# Patient Record
Sex: MALE | Race: WHITE | Employment: FULL TIME | URBAN - METROPOLITAN AREA
[De-identification: names, ages, dates, MRNs, and addresses within clinical notes are randomized per-mention and may not be internally consistent; named-entity substitution may affect disease eponyms.]

---

## 2020-01-14 ENCOUNTER — APPOINTMENT (OUTPATIENT)
Dept: CT IMAGING | Age: 35
End: 2020-01-14
Attending: EMERGENCY MEDICINE
Payer: COMMERCIAL

## 2020-01-14 ENCOUNTER — HOSPITAL ENCOUNTER (EMERGENCY)
Age: 35
Discharge: HOME | End: 2020-01-14
Attending: EMERGENCY MEDICINE
Payer: COMMERCIAL

## 2020-01-14 ENCOUNTER — APPOINTMENT (OUTPATIENT)
Dept: GENERAL RADIOLOGY | Age: 35
End: 2020-01-14
Attending: PHYSICIAN ASSISTANT
Payer: COMMERCIAL

## 2020-01-14 VITALS
DIASTOLIC BLOOD PRESSURE: 84 MMHG | OXYGEN SATURATION: 97 % | TEMPERATURE: 98.5 F | HEART RATE: 84 BPM | WEIGHT: 315 LBS | BODY MASS INDEX: 47.74 KG/M2 | SYSTOLIC BLOOD PRESSURE: 127 MMHG | HEIGHT: 68 IN | RESPIRATION RATE: 18 BRPM

## 2020-01-14 DIAGNOSIS — R07.9 CHEST PAIN, UNSPECIFIED TYPE: Primary | ICD-10-CM

## 2020-01-14 LAB
ANION GAP SERPL CALC-SCNC: 4 MMOL/L (ref 5–15)
ATRIAL RATE: 95 BPM
BASOPHILS # BLD: 0 K/UL (ref 0–0.1)
BASOPHILS NFR BLD: 0 % (ref 0–1)
BUN SERPL-MCNC: 13 MG/DL (ref 6–20)
BUN/CREAT SERPL: 18 (ref 12–20)
CALCIUM SERPL-MCNC: 9.7 MG/DL (ref 8.5–10.1)
CALCULATED P AXIS, ECG09: 17 DEGREES
CALCULATED R AXIS, ECG10: 24 DEGREES
CALCULATED T AXIS, ECG11: 0 DEGREES
CHLORIDE SERPL-SCNC: 104 MMOL/L (ref 97–108)
CO2 SERPL-SCNC: 28 MMOL/L (ref 21–32)
CREAT SERPL-MCNC: 0.72 MG/DL (ref 0.7–1.3)
D DIMER PPP FEU-MCNC: 0.67 MG/L FEU (ref 0–0.65)
DIAGNOSIS, 93000: NORMAL
DIFFERENTIAL METHOD BLD: NORMAL
EOSINOPHIL # BLD: 0.1 K/UL (ref 0–0.4)
EOSINOPHIL NFR BLD: 1 % (ref 0–7)
ERYTHROCYTE [DISTWIDTH] IN BLOOD BY AUTOMATED COUNT: 13.2 % (ref 11.5–14.5)
GLUCOSE SERPL-MCNC: 121 MG/DL (ref 65–100)
HCT VFR BLD AUTO: 48.3 % (ref 36.6–50.3)
HGB BLD-MCNC: 16.1 G/DL (ref 12.1–17)
IMM GRANULOCYTES # BLD AUTO: 0 K/UL (ref 0–0.04)
IMM GRANULOCYTES NFR BLD AUTO: 0 % (ref 0–0.5)
LYMPHOCYTES # BLD: 2.4 K/UL (ref 0.8–3.5)
LYMPHOCYTES NFR BLD: 26 % (ref 12–49)
MCH RBC QN AUTO: 29.7 PG (ref 26–34)
MCHC RBC AUTO-ENTMCNC: 33.3 G/DL (ref 30–36.5)
MCV RBC AUTO: 89 FL (ref 80–99)
MONOCYTES # BLD: 0.7 K/UL (ref 0–1)
MONOCYTES NFR BLD: 8 % (ref 5–13)
NEUTS SEG # BLD: 6 K/UL (ref 1.8–8)
NEUTS SEG NFR BLD: 65 % (ref 32–75)
NRBC # BLD: 0 K/UL (ref 0–0.01)
NRBC BLD-RTO: 0 PER 100 WBC
P-R INTERVAL, ECG05: 170 MS
PLATELET # BLD AUTO: 294 K/UL (ref 150–400)
PMV BLD AUTO: 11.5 FL (ref 8.9–12.9)
POTASSIUM SERPL-SCNC: 3.9 MMOL/L (ref 3.5–5.1)
Q-T INTERVAL, ECG07: 332 MS
QRS DURATION, ECG06: 106 MS
QTC CALCULATION (BEZET), ECG08: 417 MS
RBC # BLD AUTO: 5.43 M/UL (ref 4.1–5.7)
SODIUM SERPL-SCNC: 136 MMOL/L (ref 136–145)
TROPONIN I SERPL-MCNC: <0.05 NG/ML
VENTRICULAR RATE, ECG03: 95 BPM
WBC # BLD AUTO: 9.2 K/UL (ref 4.1–11.1)

## 2020-01-14 PROCEDURE — 84484 ASSAY OF TROPONIN QUANT: CPT

## 2020-01-14 PROCEDURE — 74011636320 HC RX REV CODE- 636/320: Performed by: RADIOLOGY

## 2020-01-14 PROCEDURE — 80048 BASIC METABOLIC PNL TOTAL CA: CPT

## 2020-01-14 PROCEDURE — 99284 EMERGENCY DEPT VISIT MOD MDM: CPT

## 2020-01-14 PROCEDURE — 36415 COLL VENOUS BLD VENIPUNCTURE: CPT

## 2020-01-14 PROCEDURE — 85025 COMPLETE CBC W/AUTO DIFF WBC: CPT

## 2020-01-14 PROCEDURE — 71046 X-RAY EXAM CHEST 2 VIEWS: CPT

## 2020-01-14 PROCEDURE — 71275 CT ANGIOGRAPHY CHEST: CPT

## 2020-01-14 PROCEDURE — 93005 ELECTROCARDIOGRAM TRACING: CPT

## 2020-01-14 PROCEDURE — 85379 FIBRIN DEGRADATION QUANT: CPT

## 2020-01-14 PROCEDURE — 74011250637 HC RX REV CODE- 250/637: Performed by: PHYSICIAN ASSISTANT

## 2020-01-14 RX ORDER — GUAIFENESIN 100 MG/5ML
162 LIQUID (ML) ORAL
Status: COMPLETED | OUTPATIENT
Start: 2020-01-14 | End: 2020-01-14

## 2020-01-14 RX ADMIN — IOPAMIDOL 80 ML: 755 INJECTION, SOLUTION INTRAVENOUS at 16:22

## 2020-01-14 RX ADMIN — ASPIRIN 162 MG: 81 TABLET, CHEWABLE ORAL at 15:05

## 2020-01-14 NOTE — ED TRIAGE NOTES
Pt went to [facility] for chest pain, nausea, and left arm pain starting thursday and referred here for abnormal EKG. Negative trop.

## 2020-01-14 NOTE — LETTER
[address] 
[facility] EMERGENCY DEPT 
[address] [facility] [place] [facility] [id number] [phone] Work/School Note Date: 1/14/2020 To Whom It May concern: 
 
Ismael Lee was seen and treated today in the emergency room by the following provider(s): 
Attending Provider: Aby Oropeza, [address] may return to work on 12/16/20. Sincerely, Cassandra Hernandez RN

## 2020-01-14 NOTE — ED PROVIDER NOTES
Pt seen at [facility] today and advised to ED due to abnormal EKG. He went to [facility] due to intermittent chest pain since Thursday (5 days ago), with accompanying L arm pain. His episodes of pain usually last a few minutes. He also endorses nausea. He denies vomiting. He is a pt of , and last had a stress test in 2013. He takes 81 mg aspirin daily, and took a dose this AM.     I have evaluated the patient as the Provider in Triage. I have reviewed His vital signs and the triage nurse assessment. I have talked with the patient and any available family and advised that I am the provider in triage and have ordered the appropriate study to initiate their work up based on the clinical presentation during my assessment. I have advised that the patient will be accommodated in the Main ED as soon as possible. I have also requested to contact the triage nurse or myself immediately if the patient experiences any changes in their condition during this brief waiting period. Note written by Ute Graham Cha, as dictated by Vanessa Valdez PA-C 2:08 PM  ---  37 y.o. male with past medical history significant for dyslipidemia, metabolic syndrome, seasonal allergies, asthma, HTN, and PVC's who presents from private vehicle with chief complaint of chest pain. Pt c/o 5 days of intermittent left upper chest pain. Pt's chest pain is not worsened with exercise. Pt has not taken any other medications for his chest pain. Pt reports previous hx of stress test by Dr. Lizzy Nur (Cardiology). Pt denies SOB, dizziness, diaphoresis, N/V, recent travel, hx of CAD, or hx of blood clots. There are no other acute medical concerns at this time. Social hx: Former tobacco use. Significant FMHx: Father has a hx of CAD later in life. PCP: Damian Arroyo MD    Note written by Ute Orantes, as dictated by Filbert Fothergill, MD 2:38 PM        The history is provided by the patient.  No  was used.        Past Medical History:   Diagnosis Date    Asthma     Dyslipidemia     Family history of cardiovascular disease     History of seasonal allergies     HTN (hypertension)     Metabolic syndrome     Morbid obesity ([facility] [address]     PVC's (premature ventricular contractions)     bigeminy       No past surgical history on file.       Family History:   Problem Relation Age of Onset    Heart Disease Father     Attention Deficit Disorder Father     Hypertension Father     Hypertension Mother     Asthma Mother        Social History     Socioeconomic History    Marital status:      Spouse name: Not on file    Number of children: Not on file    Years of education: Not on file    Highest education level: Not on file   Occupational History    Not on file   Social Needs    Financial resource strain: Not on file    Food insecurity:     Worry: Not on file     Inability: Not on file    Transportation needs:     Medical: Not on file     Non-medical: Not on file   Tobacco Use    Smoking status: Heavy Tobacco Smoker     Packs/day: 1.00   Substance and Sexual Activity    Alcohol use: Yes     Comment: rare    Drug use: Not on file    Sexual activity: Not on file   Lifestyle    Physical activity:     Days per week: Not on file     Minutes per session: Not on file    Stress: Not on file   Relationships    Social connections:     Talks on phone: Not on file     Gets together: Not on file     Attends Religion service: Not on file     Active member of club or organization: Not on file     Attends meetings of clubs or organizations: Not on file     Relationship status: Not on file    Intimate partner violence:     Fear of current or ex partner: Not on file     Emotionally abused: Not on file     Physically abused: Not on file     Forced sexual activity: Not on file   Other Topics Concern    Not on file   Social History Narrative    Not on file         ALLERGIES: Patient has no known allergies. Review of Systems   Constitutional: Negative for activity change, chills and diaphoresis. HENT: Negative for nosebleeds, sore throat, trouble swallowing and voice change. Eyes: Negative for visual disturbance. Cardiovascular: Positive for chest pain. Gastrointestinal: Negative for constipation, diarrhea, nausea and vomiting. Genitourinary: Negative for difficulty urinating, dysuria, hematuria and urgency. Musculoskeletal: Negative for neck pain and neck stiffness. Skin: Negative for color change. Allergic/Immunologic: Negative for immunocompromised state. Neurological: Negative for dizziness, seizures, syncope and light-headedness. Psychiatric/Behavioral: Negative for behavioral problems, confusion, hallucinations, self-injury and suicidal ideas. Vitals:    01/14/20 1405   BP: 160/86   Pulse: (!) 108   Resp: 14   Temp: 98.5 °F (36.9 °C)   SpO2: 97%   Weight: 152 kg (335 lb)   Height: 5' 8\" (1.727 m)            Physical Exam  Vitals signs and nursing note reviewed. Constitutional:       General: He is not in acute distress. Appearance: He is well-developed. He is not diaphoretic. HENT:      Head: Normocephalic and atraumatic. Eyes:      Pupils: Pupils are equal, round, and reactive to light. Neck:      Musculoskeletal: Normal range of motion and neck supple. Cardiovascular:      Rate and Rhythm: Normal rate and regular rhythm. Heart sounds: Normal heart sounds. No murmur. No friction rub. No gallop. Pulmonary:      Effort: Pulmonary effort is normal. No respiratory distress. Breath sounds: Normal breath sounds. No wheezing. Chest:      Comments: No pain to the left upper chest.   Abdominal:      General: Bowel sounds are normal. There is no distension. Palpations: Abdomen is soft. Tenderness: There is no tenderness. There is no guarding or rebound. Musculoskeletal: Normal range of motion. Skin:     General: Skin is warm. Findings: No rash. Neurological:      Mental Status: He is alert and oriented to person, place, and time. Psychiatric:         Behavior: Behavior normal.         Thought Content: Thought content normal.         Judgment: Judgment normal.     Note written by Ute Garcia, as dictated by Nigel Collins MD 2:38 PM       MDM     This is a 61-year-old male with past medical history, review of systems, physical exam as above, presenting with complaints for approximately 5 days of intermittent left upper chest pain, characterized as sharp in nature, nonradiating, not associated with shortness of breath, diaphoresis, syncope, nausea or vomiting, not made better by rest, nor worsened by exercise, patient denies having taken medications for symptoms. He endorses a history of hypertension, obesity, family history of coronary disease, previous tobacco use, no history of hyperlipidemia, diabetes, patient states previous stress testing was unremarkable. Physical exam is remarkable for well-appearing obese male, in no acute distress, with no reproducible chest pain to palpation, noted to have clear breath sounds, soft abdomen, regular rate and rhythm without murmurs gallops or rubs. Differential includes ACS, chest wall pain, PE. Plan to obtain CMP, CBC, EKG, chest x-ray, cardiac enzymes, d-dimer. We will reassess, and make a disposition, however patient states pain has been ongoing for approximately 5 days, thus cardiac enzymes should be elevated by this point time. Disposition pending. Procedures    ED EKG interpretation:  Rhythm: normal sinus rhythm; and regular .  Rate (approx.): 95 BPM; Axis: normal; ST/T wave: normal;   Note written by Ute Garcia, as dictated by Nigel Collins MD 2:25 PM

## 2020-01-14 NOTE — DISCHARGE INSTRUCTIONS

## 2020-02-03 ENCOUNTER — OFFICE VISIT (OUTPATIENT)
Dept: CARDIOLOGY CLINIC | Age: 35
End: 2020-02-03

## 2020-02-03 VITALS
BODY MASS INDEX: 47.74 KG/M2 | DIASTOLIC BLOOD PRESSURE: 88 MMHG | HEART RATE: 91 BPM | SYSTOLIC BLOOD PRESSURE: 130 MMHG | WEIGHT: 315 LBS | OXYGEN SATURATION: 97 % | HEIGHT: 68 IN

## 2020-02-03 DIAGNOSIS — E66.01 MORBID OBESITY (HCC): ICD-10-CM

## 2020-02-03 DIAGNOSIS — G47.33 OSA (OBSTRUCTIVE SLEEP APNEA): ICD-10-CM

## 2020-02-03 DIAGNOSIS — I51.7 LVH (LEFT VENTRICULAR HYPERTROPHY): ICD-10-CM

## 2020-02-03 DIAGNOSIS — I10 HTN (HYPERTENSION), BENIGN: ICD-10-CM

## 2020-02-03 DIAGNOSIS — R07.9 CHEST PAIN SYNDROME: Primary | ICD-10-CM

## 2020-02-03 RX ORDER — PHENTERMINE HYDROCHLORIDE 37.5 MG/1
TABLET ORAL
COMMUNITY
Start: 2019-12-02

## 2020-02-03 NOTE — PROGRESS NOTES
Chief Complaint   Patient presents with   [facility] Follow Up     Visit Vitals  /88 (BP 1 Location: Left arm, BP Patient Position: Sitting)   Pulse 91   Ht 5' 8\" (1.727 m)   Wt 338 lb (153.3 kg)   SpO2 97%   BMI 51.39 kg/m²     Patient in [facility] does have PVC's but no palpitations aware of  no other cardiac complaints. No hospital stays.     No cardiac refills

## 2020-02-03 NOTE — PROGRESS NOTES
William Rosen MD [facility]  [address]  [place], [address] [facility]    Office [phone]  Fax [phone]  Cell [phone]       Mehrdad Leos is a 37 y.o. male referred for evaluation of chest pain. Consult requested by Tesfaye Rivera MD      Diagnoses  Encounter Diagnoses     ICD-10-CM ICD-9-CM   1. Chest pain syndrome R07.9 786.50   2. Morbid obesity ([address] E66.01 278.01   3. HTN (hypertension), benign I10 401.1   4. ELEAZAR (obstructive sleep apnea) G47.33 327.23   5. LVH (left ventricular hypertrophy) I51.7 429.3       Assessment/Recommendations:    Recent chest pain syndrome, clearly non cardiac. He has no ongoing sxs, nor any exertional sxs,. Stress echo 2013 was normal. Would not pursue repeat stress test at this time    Chronic HTN in the setting of morbid obesity. Evidence of LVH by EKG and noted on CT scan. He only recently resumed his BP meds. Echo 2013 was remarkable for mild RV dilation, biatrial enlargement, but no LVH. - Reassess with echo near future. ELEAZAR, untreated. He clearly has significant sxs. I urged him to touch [facility] sleep apnea. Morbid obesity, recently started on Phentermine. He has a lot of stressors in his life: work, kids, etc.    Phone follow up after reviewing tests       Subjective:    Mehrdad Leos has no previous CV hx except for HTN. He reports he recently had significant chest pain with arm pain and reflux. ED evaluation at [address] was negative including chest CTA. Today, he is feeling good with no recurrent chest pain. He stays fairly active with no exertional sxs. However he does not exercise regularly. Patient denies any exertional chest pain, dyspnea, palpitations, syncope, orthopnea, edema or paroxysmal nocturnal dyspnea. He is not always compliant with his BP medication regimen. He admits to white coat phenomena.      He has ELEAZAR, but does not always use CPAP - he says the tube disconnects at night while he is asleep and unaware. Jessica Rm He feels tired when he wakes. Daytime fatigue noted. Of note, he works as a deputy. He has two kids, aged 5 and 10. Cardiac risk factors   HTN yes  DM  no  Smoking yes  Family hx of CAD yes, father with heart disease      Cardiac testing  Echo 4/8/13 - EF 60% , right ventricle dilatation and systolic function reduced, bilateral atrial dilatation  Stress echo 11/21/13 - 8 minutes, normal      Past Medical History:   Diagnosis Date    Asthma     Dyslipidemia     Family history of cardiovascular disease     History of seasonal allergies     HTN (hypertension)     Metabolic syndrome     Morbid obesity ([facility] [address]     PVC's (premature ventricular contractions)     bigeminy        Social History     Socioeconomic History    Marital status:      Spouse name: Not on file    Number of children: Not on file    Years of education: Not on file    Highest education level: Not on file   Tobacco Use    Smoking status: Current Every Day Smoker     Packs/day: 1.00    Smokeless tobacco: Current User    Tobacco comment: VAPES   Substance and Sexual Activity    Alcohol use: Yes     Comment: rare       Current Outpatient Medications   Medication Sig Dispense Refill    phentermine (ADIPEX-P) 37.5 mg tablet       losartan (COZAAR) 25 mg tablet Take  by mouth daily.  triamterene-hydrochlorothiazide (DYAZIDE) 37.5-25 mg per capsule Take  by mouth daily.  albuterol sulfate (PROVENTIL;VENTOLIN) 2.5 mg/0.5 mL Nebu nebulizer solution by Nebulization route as needed.  aspirin delayed-release 81 mg tablet Take  by mouth daily.  HYDROcodone-acetaminophen (NORCO) 5-325 mg per tablet Take 1 Tab by mouth every four (4) hours as needed for Pain. 20 Tab 0       No Known Allergies       Review of Symptoms:  Constitutional: Negative for fever, chills, malaise/fatigue and diaphoresis.    Respiratory: Negative for cough, hemoptysis, sputum production, and wheezing. Cardiovascular: Negative for palpitations, orthopnea, claudication, leg swelling and PND. +chest pain  Gastrointestinal: Negative for nausea, vomiting, blood in stool and melena. +GERD  Genitourinary: Negative for dysuria and flank pain. Musculoskeletal: Negative for joint pain and back pain. Skin: Negative for rash. Neurological: Negative for focal weakness, seizures, loss of consciousness, weakness and headaches. Endo/Heme/Allergies: Does not bruise/bleed easily. Psychiatric/Behavioral: Negative for memory loss. The patient does not have insomnia. Physical Exam  Visit Vitals  /88 (BP 1 Location: Left arm, BP Patient Position: Sitting)   Pulse 91   Ht 5' 8\" (1.727 m)   Wt 338 lb (153.3 kg)   SpO2 97%   BMI 51.39 kg/m²     Wt Readings from Last 3 Encounters:   02/03/20 338 lb (153.3 kg)   01/14/20 335 lb (152 kg)   03/13/15 335 lb (152 kg)     General - WDWN  HEENT: Eyes without jaundice, Hearing intact  Neck - JVP normal, thyroid nl  Cardiac - normal S1,S2, no murmurs, rubs or gallops. No clicks  Vascular - carotids without bruits, radials, femorals and pedal pulses equal bilateral  Lungs - clear to auscultation bilaterals, no rales ,wheezing or rhonchi  Abd - soft nontender, no HSM, no abd bruits  Extremities - no edema  Neuro - nonfocal, normal gait  Psych - mood and affect normal    Labs:      Cardiographics  Echo 4/8/13 - EF 60% , right ventricle dilatation and systolic function reduced, bilateral atrial dilatation  Stress echo 11/21/13 - 8 minutes, normal   EKG 2/3/20 - SR, LVH, nonspecific T wave flattening. Compared with 10/30/13, LVH newly noted. Written by Hue Grande, as dictated by Phylicia Howe M.D.      Phylicia Howe MD

## 2020-02-03 NOTE — Clinical Note
2/3/20 Patient: Ismael Lee YOB: 1985 Date of Visit: 2/3/2020 Sydney Pierce MD 
Patient Can Only Remember The Practice Name And Not The Physician 
VIA Dear Savannah Pierce MD, Thank you for referring Mr. Mike Cantrell to [facility] for evaluation. My notes for this consultation are attached. If you have questions, please do not hesitate to call me. I look forward to following your patient along with you. Sincerely, Boo Cisneros MD

## 2020-02-19 ENCOUNTER — PATIENT MESSAGE (OUTPATIENT)
Dept: CARDIOLOGY CLINIC | Age: 35
End: 2020-02-19

## 2020-03-09 ENCOUNTER — TELEPHONE (OUTPATIENT)
Dept: CARDIOLOGY CLINIC | Age: 35
End: 2020-03-09

## 2020-03-09 NOTE — TELEPHONE ENCOUNTER
[id number] read MY CHART message to patient. Answered all questions. Patient understands he must monitor BP and call if >140sys. Needs 6 month follow up appt.